# Patient Record
Sex: FEMALE | Race: BLACK OR AFRICAN AMERICAN | NOT HISPANIC OR LATINO | Employment: UNEMPLOYED | URBAN - NONMETROPOLITAN AREA
[De-identification: names, ages, dates, MRNs, and addresses within clinical notes are randomized per-mention and may not be internally consistent; named-entity substitution may affect disease eponyms.]

---

## 2023-03-13 ENCOUNTER — OFFICE VISIT (OUTPATIENT)
Dept: PRIMARY CARE | Facility: CLINIC | Age: 28
End: 2023-03-13
Payer: COMMERCIAL

## 2023-03-13 VITALS
SYSTOLIC BLOOD PRESSURE: 138 MMHG | DIASTOLIC BLOOD PRESSURE: 88 MMHG | BODY MASS INDEX: 34.36 KG/M2 | WEIGHT: 232 LBS | OXYGEN SATURATION: 97 % | HEIGHT: 69 IN | HEART RATE: 96 BPM

## 2023-03-13 DIAGNOSIS — J06.0 ACUTE LARYNGOPHARYNGITIS: Primary | ICD-10-CM

## 2023-03-13 PROCEDURE — 1036F TOBACCO NON-USER: CPT | Performed by: PHYSICIAN ASSISTANT

## 2023-03-13 PROCEDURE — 99214 OFFICE O/P EST MOD 30 MIN: CPT | Performed by: PHYSICIAN ASSISTANT

## 2023-03-13 RX ORDER — BROMPHENIRAMINE MALEATE, PSEUDOEPHEDRINE HYDROCHLORIDE, AND DEXTROMETHORPHAN HYDROBROMIDE 2; 30; 10 MG/5ML; MG/5ML; MG/5ML
5 SYRUP ORAL 4 TIMES DAILY PRN
Qty: 120 ML | Refills: 1 | Status: SHIPPED | OUTPATIENT
Start: 2023-03-13 | End: 2023-12-12 | Stop reason: ALTCHOICE

## 2023-03-13 RX ORDER — PREDNISONE 10 MG/1
10 TABLET ORAL 2 TIMES DAILY
Qty: 10 TABLET | Refills: 0 | Status: SHIPPED | OUTPATIENT
Start: 2023-03-13 | End: 2023-03-18

## 2023-03-13 RX ORDER — AZITHROMYCIN 250 MG/1
250 TABLET, FILM COATED ORAL DAILY
Qty: 6 TABLET | Refills: 0 | Status: SHIPPED | OUTPATIENT
Start: 2023-03-13 | End: 2023-12-12 | Stop reason: ALTCHOICE

## 2023-03-13 RX ORDER — SERTRALINE HYDROCHLORIDE 50 MG/1
50 TABLET, FILM COATED ORAL DAILY
COMMUNITY

## 2023-03-13 RX ORDER — BUPROPION HYDROCHLORIDE 300 MG/1
1 TABLET ORAL DAILY
COMMUNITY
Start: 2023-02-22

## 2023-03-13 RX ORDER — BUSPIRONE HYDROCHLORIDE 15 MG/1
15 TABLET ORAL 3 TIMES DAILY
COMMUNITY

## 2023-03-13 NOTE — PROGRESS NOTES
Subjective   Patient ID: Kathy Murphy is a 27 y.o. female who presents for Sore Throat and Nasal Congestion. Patient states it has been going on for a week and a half.     HPI     Review of Systems    Objective   There were no vitals taken for this visit.    Physical Exam    Assessment/Plan

## 2023-03-13 NOTE — PROGRESS NOTES
"Subjective   Patient ID: Kathy Murphy is a 27 y.o. female who presents for Sore Throat and Nasal Congestion.  HPI  Presents for evaluation of URI.  Symptoms including cough, congestion, sore throat, loss of voice, nasal congestion have been present for 7-10 days and refractory to OTC meds.  No fever, chills, nausea, vomiting, abdominal pain, CP, or SOB.  No exacerbating factors    Review of Systems  Review of Systems   Constitutional:  See HPI   ENT: See HPI  Respiratory: See HPI  Cardiovascular:  No chest pain.    Gastrointestinal: See HPI  Neurologic:  Alert and oriented X4, No numbness, No tingling.    All other systems are negative     Objective     /88   Pulse 96   Ht 1.753 m (5' 9\")   Wt 105 kg (232 lb)   SpO2 97%   BMI 34.26 kg/m²     Physical Exam    General:  Alert and oriented, No acute distress.    Eye:  Pupils are equal, round and reactive to light, Normal conjunctiva.    HENT:  Normocephalic, unremarkable oropharynx; nontender cervical lymph nodes; loss of voice noted  Respiratory: Respirations are non-labored, LCTA bilaterally  Musculoskeletal: Normal ROM and strength  Integumentary:  Warm, Dry, Intact, No pallor, No rash.    Neurologic:  Alert, Oriented, Normal sensory, Cranial Nerves II-XII are grossly intact  Psychiatric:  Cooperative, Appropriate mood & affect.    Assessment/Plan   Upper respiratory infection/acute laryngeal pharyngitis: Z-Adan, low-dose prednisone, and Bromfed.  Follow-up as needed or as scheduled  Problem List Items Addressed This Visit    None  Visit Diagnoses       Acute laryngopharyngitis    -  Primary    Relevant Medications    azithromycin (Zithromax) 250 mg tablet    predniSONE (Deltasone) 10 mg tablet    brompheniramine-pseudoeph-DM (Bromfed DM) 2-30-10 mg/5 mL syrup          "

## 2023-04-06 ENCOUNTER — OFFICE VISIT (OUTPATIENT)
Dept: PRIMARY CARE | Facility: CLINIC | Age: 28
End: 2023-04-06
Payer: COMMERCIAL

## 2023-04-06 VITALS
DIASTOLIC BLOOD PRESSURE: 80 MMHG | BODY MASS INDEX: 34.07 KG/M2 | HEART RATE: 97 BPM | HEIGHT: 69 IN | WEIGHT: 230 LBS | SYSTOLIC BLOOD PRESSURE: 122 MMHG

## 2023-04-06 DIAGNOSIS — L98.9 SKIN LESIONS: Primary | ICD-10-CM

## 2023-04-06 DIAGNOSIS — M54.50 CHRONIC RIGHT-SIDED LOW BACK PAIN WITHOUT SCIATICA: ICD-10-CM

## 2023-04-06 DIAGNOSIS — G89.29 CHRONIC RIGHT-SIDED LOW BACK PAIN WITHOUT SCIATICA: ICD-10-CM

## 2023-04-06 DIAGNOSIS — H53.9 CHANGES IN VISION: ICD-10-CM

## 2023-04-06 DIAGNOSIS — R41.0 CONFUSION AND DISORIENTATION: ICD-10-CM

## 2023-04-06 DIAGNOSIS — G89.29 CHRONIC SACROILIAC PAIN: ICD-10-CM

## 2023-04-06 DIAGNOSIS — R42 DIZZINESS: ICD-10-CM

## 2023-04-06 DIAGNOSIS — M53.3 CHRONIC SACROILIAC PAIN: ICD-10-CM

## 2023-04-06 PROCEDURE — 99215 OFFICE O/P EST HI 40 MIN: CPT | Performed by: PHYSICIAN ASSISTANT

## 2023-04-06 PROCEDURE — 1036F TOBACCO NON-USER: CPT | Performed by: PHYSICIAN ASSISTANT

## 2023-04-06 RX ORDER — OXYBUTYNIN CHLORIDE 10 MG/1
1 TABLET, EXTENDED RELEASE ORAL DAILY
COMMUNITY
Start: 2023-03-26

## 2023-04-06 RX ORDER — METHOCARBAMOL 750 MG/1
750 TABLET, FILM COATED ORAL 4 TIMES DAILY
Qty: 30 TABLET | Refills: 1 | Status: SHIPPED | OUTPATIENT
Start: 2023-04-06

## 2023-04-06 RX ORDER — PREDNISONE 10 MG/1
TABLET ORAL
Qty: 12 TABLET | Refills: 0 | Status: SHIPPED | OUTPATIENT
Start: 2023-04-06 | End: 2023-04-06 | Stop reason: SDUPTHER

## 2023-04-06 RX ORDER — METHOCARBAMOL 750 MG/1
750 TABLET, FILM COATED ORAL 4 TIMES DAILY
Qty: 30 TABLET | Refills: 1 | Status: SHIPPED | OUTPATIENT
Start: 2023-04-06 | End: 2023-04-06 | Stop reason: SDUPTHER

## 2023-04-06 RX ORDER — PREDNISONE 10 MG/1
TABLET ORAL
Qty: 12 TABLET | Refills: 0 | Status: SHIPPED | OUTPATIENT
Start: 2023-04-06 | End: 2023-12-06

## 2023-04-06 NOTE — PROGRESS NOTES
Subjective   Patient ID: Kathy Murphy is a 27 y.o. female who presents for Anxiety (Pt states she just closed on a house and her anxiety has been a little high), Fatigue (For the last couple weeks), Dizziness (Pt states she has been issues with dizziness when getting up/It only happened a few times; she has been falling into walls), Numbness (A couple weeks ago she noticed both of her arms were numb and achy ), Blurred Vision (Pt states it happens occasionally; seeing white floaters), Speech Problem (Pt states her speech has been off; she states her speech has been backwards/She feels it may be Neurological ), Hot Flashes (Pt reports she is still getting really sweaty ), and Back Pain (Pt reports lower back pain that has been present for awhile/She is seeing a chiropractor; acupuncture etc./She ended up falling off a hover board on her LT buttock about a year ago).  HPI    Patient presents for multiple complaints.    Patient reports chronic low back pain on the right, patient fell off a hover board approximate 1 year ago.  Patient has been seeing a chiropractor, has undergone acupuncture, and massage therapy with no relief.  Patient states that pain is primarily on the right and near the buttocks.  Patient denies any radiculopathy or paresthesias in the legs.  No saddle paresthesias.  Currently, the patient reports only mild to moderate pain.    Patient also has some nebulous complaints including scotoma, confusion, disorientation, dizziness, and upper arm paresthesias.  Patient states that these are fleeting and self-limited.  No report that they all occur simultaneously.  Dizziness occurred approximately 2 to 3 weeks ago.  Patient states that confusion with speech patterns has been recent.  Seeing floaters is intermittent.  Currently, patient is not complaining of any of the symptoms.    Patient also reports recent fatigue.  Patient complains of early fatigue with activity.  Patient has been taking naps  "recently.  No other associated signs or symptoms.  Review of Systems    Constitutional:  See HPI   Eye: See HPI  Respiratory:  No shortness of breath, No cough.    Cardiovascular:  No chest pain.     Musculoskeletal: See HPI  Integumentary:  No rash.    Neurologic: See HPI  All other systems are negative     Objective     /80 (BP Location: Left arm, Patient Position: Sitting, BP Cuff Size: Adult)   Pulse 97   Ht 1.753 m (5' 9\")   Wt 104 kg (230 lb)   BMI 33.97 kg/m²     Physical Exam  General:  Alert and oriented, No acute distress.    Eye:  Pupils are equal, round and reactive to light, Extraocular movements are intact, Normal conjunctiva.    HENT:  Normocephalic, Normal hearing, Oral mucosa is moist, No pharyngeal erythema, No sinus tenderness.    Neck:  Supple, Non-tender, No lymphadenopathy.    Respiratory:  Lungs are clear to auscultation, Respirations are non-labored, Breath sounds are equal    Cardiovascular:  Normal rate, Regular rhythm.    Gastrointestinal:  Non-distended.    Musculoskeletal: Normal range of motion, Normal strength, some tenderness near the right sacroiliac joint; full lumbar range of motion noted; no midline tenderness or step-offs normal gait.     Integumentary:  Warm, Dry, Intact, No pallor, No rash.    Neurologic:  Alert, Oriented, Normal sensory, Normal motor function, No focal deficits, Cranial Nerves II-XII are grossly intact   Psychiatric:  Cooperative, Appropriate mood & affect.    Assessment/Plan   Chronic right-sided low back pain-sacroiliitis: Prednisone and Robaxin.  Referral to physical therapy ordered.  Topical formulation from transdermal therapeutic ordered.    Changes in vision/dizziness/disorientation: CT brain ordered.  Patient had labs done in January that were relatively unremarkable.  Further recommendations pending results.  Problem List Items Addressed This Visit       Changes in vision    Relevant Orders    CT head w and wo IV contrast     Other Visit " Diagnoses       Chronic right-sided low back pain without sciatica    -  Primary    Relevant Medications    predniSONE (Deltasone) 10 mg tablet    Other Relevant Orders    Referral to Physical Therapy    Chronic sacroiliac pain        Relevant Medications    predniSONE (Deltasone) 10 mg tablet    methocarbamol (Robaxin) 750 mg tablet    Confusion and disorientation        Relevant Orders    CT head w and wo IV contrast    Dizziness        Relevant Orders    CT head w and wo IV contrast            Final diagnoses:   [M54.50, G89.29] Chronic right-sided low back pain without sciatica   [M53.3, G89.29] Chronic sacroiliac pain   [H53.9] Changes in vision   [R41.0] Confusion and disorientation   [R42] Dizziness

## 2023-04-12 ENCOUNTER — TELEPHONE (OUTPATIENT)
Dept: PRIMARY CARE | Facility: CLINIC | Age: 28
End: 2023-04-12
Payer: COMMERCIAL

## 2023-12-06 ENCOUNTER — OFFICE VISIT (OUTPATIENT)
Dept: URGENT CARE | Facility: CLINIC | Age: 28
End: 2023-12-06
Payer: COMMERCIAL

## 2023-12-06 VITALS
RESPIRATION RATE: 16 BRPM | OXYGEN SATURATION: 98 % | HEART RATE: 100 BPM | TEMPERATURE: 98.1 F | SYSTOLIC BLOOD PRESSURE: 101 MMHG | DIASTOLIC BLOOD PRESSURE: 70 MMHG

## 2023-12-06 DIAGNOSIS — J20.8 VIRAL BRONCHITIS: ICD-10-CM

## 2023-12-06 DIAGNOSIS — J06.9 VIRAL URI WITH COUGH: Primary | ICD-10-CM

## 2023-12-06 LAB
POC TRIPLEX FLU A-AG: NORMAL
POC TRIPLEX FLU B-AG: NORMAL
POC TRIPLEX SARSCOV-2 AG: NORMAL

## 2023-12-06 PROCEDURE — 99213 OFFICE O/P EST LOW 20 MIN: CPT | Mod: 25

## 2023-12-06 PROCEDURE — 87428 SARSCOV & INF VIR A&B AG IA: CPT

## 2023-12-06 RX ORDER — PREDNISONE 10 MG/1
30 TABLET ORAL DAILY
Qty: 21 TABLET | Refills: 0 | Status: SHIPPED | OUTPATIENT
Start: 2023-12-06 | End: 2023-12-13

## 2023-12-06 NOTE — PROGRESS NOTES
[facility] [place] NOTE:      Name: Kathy Murphy, 28 y.o.    CSN:[id number]   MRN:[id number]    PCP: Daryn Decker PA-C    ALL:    Allergies   Allergen Reactions    Aripiprazole Other       History:    Chief Complaint: Cough, Wheezing, Nasal Congestion, and Headache (X 2 WEEKS)    Encounter Date: 12/6/2023     HPI: The history was obtained from the patient. Kathy is a 28 y.o. female, who presents with a chief complaint of Cough, Wheezing, Nasal Congestion, and Headache (X 2 WEEKS). She states she does not have much nasal congestion but has sinus pressure. She has been taking ibuprofen for headache symptoms which she says has helped. She also has been taking leftover amoxicillin x 1 week twice a day with no improvement in symptoms. She states her daughter has the same illness. She denies fevers, N/V, sob, chest pain, abdominal pain.     PMHx:    Past Medical History:   Diagnosis Date    Bipolar disorder, currently in remission, most recent episode unspecified (CMS/formerly [facility]) 03/18/2022    History of depressed bipolar disorder    Chlamydial infection, unspecified 04/29/2020    Chlamydia infection    COVID-19 04/08/2022    COVID-19 virus infection    Encounter for full-term uncomplicated delivery     Normal vaginal delivery    Encounter for gynecological examination (general) (routine) without abnormal findings     Pap test, as part of routine gynecological examination    Encounter for routine postpartum follow-up 05/20/2022    Postpartum exam    Encounter for screening for infections with a predominantly sexual mode of transmission 10/04/2021    Screening for STD (sexually transmitted disease)    Encounter for screening for malignant neoplasm of cervix 10/04/2021    Encounter for screening for cervical cancer    Other conditions influencing health status     Menstruation    Other long term (current) drug therapy 08/13/2020    Controlled substance agreement signed    Personal  history of other complications of pregnancy, childbirth and the puerperium     History of spontaneous     Personal history of other infectious and parasitic diseases 2021    History of viral warts              Current Outpatient Medications   Medication Sig Dispense Refill    azithromycin (Zithromax) 250 mg tablet Take 1 tablet (250 mg) by mouth once daily. 2 tabs po day 1 1 tab po every day days 2-5 (Patient not taking: Reported on 2023) 6 tablet 0    brompheniramine-pseudoeph-DM (Bromfed DM) 2-30-10 mg/5 mL syrup Take 5 mL by mouth 4 times a day as needed for allergies, congestion or cough. (Patient not taking: Reported on 2023) 120 mL 1    buPROPion XL (Wellbutrin XL) 300 mg 24 hr tablet Take 1 tablet (300 mg) by mouth once daily.      busPIRone (Buspar) 15 mg tablet Take 1 tablet (15 mg) by mouth 3 times a day.      methocarbamol (Robaxin) 750 mg tablet Take 1 tablet (750 mg) by mouth in the morning and 1 tablet (750 mg) at noon and 1 tablet (750 mg) in the evening and 1 tablet (750 mg) before bedtime. As needed for back pain/spasm. (Patient not taking: Reported on 2023) 30 tablet 1    oxybutynin XL (Ditropan-XL) 10 mg 24 hr tablet Take 1 tablet (10 mg) by mouth once daily.      predniSONE (Deltasone) 10 mg tablet Take 3 tablets (30 mg) by mouth once daily for 7 days. 21 tablet 0    sertraline (Zoloft) 50 mg tablet Take 1 tablet (50 mg) by mouth once daily.       No current facility-administered medications for this visit.         PMSx:    Past Surgical History:   Procedure Laterality Date    OTHER SURGICAL HISTORY  2021    No history of surgery       Fam Hx: No family history on file.    SOC. Hx:     Social History     Socioeconomic History    Marital status: Single     Spouse name: Not on file    Number of children: Not on file    Years of education: Not on file    Highest education level: Not on file   Occupational History    Not on file   Tobacco Use    Smoking status:  Former     Types: Cigarettes    Smokeless tobacco: Never   Vaping Use    Vaping Use: Every day    Substances: Nicotine    Devices: Disposable   Substance and Sexual Activity    Alcohol use: Not Currently    Drug use: Never    Sexual activity: Not on file   Other Topics Concern    Not on file   Social History Narrative    Not on file     Social Determinants of Health     Financial Resource Strain: Not on file   Food Insecurity: Not on file   Transportation Needs: Not on file   Physical Activity: Not on file   Stress: Not on file   Social Connections: Not on file   Intimate Partner Violence: Not on file   Housing Stability: Not on file         Vitals:    12/06/23 1732   BP: 101/70   Pulse: 100   Resp: 16   Temp: 36.7 °C (98.1 °F)   SpO2: 98%                Physical Exam  Constitutional:       Appearance: Normal appearance.   HENT:      Right Ear: Tympanic membrane normal.      Left Ear: Tympanic membrane normal.      Nose: Nose normal.      Mouth/Throat:      Mouth: Mucous membranes are moist.      Pharynx: Oropharynx is clear.   Eyes:      Conjunctiva/sclera: Conjunctivae normal.      Pupils: Pupils are equal, round, and reactive to light.   Cardiovascular:      Rate and Rhythm: Normal rate and regular rhythm.   Pulmonary:      Effort: Pulmonary effort is normal.      Breath sounds: Normal breath sounds.   Skin:     General: Skin is warm.   Neurological:      General: No focal deficit present.      Mental Status: She is alert and oriented to person, place, and time.   Psychiatric:         Mood and Affect: Mood normal.         Behavior: Behavior normal.         LABORATORY @ RADIOLOGICAL IMAGING (if done):     Results for orders placed or performed in visit on 12/06/23 (from the past 24 hour(s))   POCT BD Veritor Triplex Ag   Result Value Ref Range    POC Triplex SARS-CoV-2 Ag  Presumptive negative for Triplex SARS-CoV-2 (no antigen detected)     Presumptive negative for Triplex SARS-CoV-2 (no antigen detected)    POC  Triplex Flu A-Ag  Presumptive negative for Triplex FLU A (no antigen detected)     Presumptive negative for Triplex FLU A (no antigen detected)    POC Triplex Flu B-Ag  Presumptive negative for Triplex FLU B (no antigen detected)     Presumptive negative for Triplex FLU B (no antigen detected)        COURSE/MEDICAL DECISION MAKING:    Kathy is a 28 y.o., who presents with a working diagnosis of   1. Viral URI with cough    2. Viral bronchitis      Kathy was seen today for cough, wheezing, nasal congestion and headache.  Diagnoses and all orders for this visit:  Viral URI with cough (Primary)  -     POCT BD Veritor Triplex Ag  -     predniSONE (Deltasone) 10 mg tablet; Take 3 tablets (30 mg) by mouth once daily for 7 days.  Viral bronchitis    After my independent evaluation, she appears to have a self-limiting illness likely due to a viral URI.   At this time, there is a no evidence of pneumonia, hypoxia, OM, bacterial sinus infection, bacterial bronchitis, bacteremia, or sepsis.     In my medical opinion, I consider Kathy to be low risk for any serious/life-threatening entity, and deem her stable for discharge. This is based on the information that was available to me at the time of this visit.      As we discussed, she is to return to our office or ER immediately if there is any worsening of her condition, such as increased cough, shortness of breath, persistent fevers, repeated vomiting, dehydration, or if her condition worsens at all.      Mahi Santos PA-C   Advanced Practice Provider  [facility] URG[facility]

## 2023-12-12 ENCOUNTER — OFFICE VISIT (OUTPATIENT)
Dept: PRIMARY CARE | Facility: CLINIC | Age: 28
End: 2023-12-12
Payer: COMMERCIAL

## 2023-12-12 VITALS
HEIGHT: 69 IN | WEIGHT: 214.9 LBS | DIASTOLIC BLOOD PRESSURE: 78 MMHG | SYSTOLIC BLOOD PRESSURE: 115 MMHG | BODY MASS INDEX: 31.83 KG/M2 | HEART RATE: 104 BPM

## 2023-12-12 DIAGNOSIS — J40 BRONCHITIS: ICD-10-CM

## 2023-12-12 DIAGNOSIS — J06.9 UPPER RESPIRATORY TRACT INFECTION, UNSPECIFIED TYPE: Primary | ICD-10-CM

## 2023-12-12 PROCEDURE — 1036F TOBACCO NON-USER: CPT | Performed by: PHYSICIAN ASSISTANT

## 2023-12-12 PROCEDURE — 99214 OFFICE O/P EST MOD 30 MIN: CPT | Performed by: PHYSICIAN ASSISTANT

## 2023-12-12 RX ORDER — BROMPHENIRAMINE MALEATE, PSEUDOEPHEDRINE HYDROCHLORIDE, AND DEXTROMETHORPHAN HYDROBROMIDE 2; 30; 10 MG/5ML; MG/5ML; MG/5ML
5 SYRUP ORAL EVERY 4 HOURS PRN
Qty: 120 ML | Refills: 1 | Status: SHIPPED | OUTPATIENT
Start: 2023-12-12

## 2023-12-12 RX ORDER — AZITHROMYCIN 250 MG/1
250 TABLET, FILM COATED ORAL DAILY
Qty: 6 TABLET | Refills: 0 | Status: SHIPPED | OUTPATIENT
Start: 2023-12-12

## 2023-12-12 RX ORDER — PREDNISONE 10 MG/1
TABLET ORAL
Qty: 30 TABLET | Refills: 0 | Status: SHIPPED | OUTPATIENT
Start: 2023-12-12

## 2023-12-12 ASSESSMENT — PATIENT HEALTH QUESTIONNAIRE - PHQ9
SUM OF ALL RESPONSES TO PHQ9 QUESTIONS 1 AND 2: 0
1. LITTLE INTEREST OR PLEASURE IN DOING THINGS: NOT AT ALL
2. FEELING DOWN, DEPRESSED OR HOPELESS: NOT AT ALL

## 2023-12-12 NOTE — PROGRESS NOTES
"Subjective   Patient ID: Kathy Murphy is a 28 y.o. female who presents for Annual Exam (Patient here today for yearly exam, patient offers no complaints./Patient scheduled for PAP with Dr. Chavez next year.).  HPI  Patient presents for annual follow-up exam and labs.    Patient is currently being treated for an upper respiratory infection with prednisone.  Symptoms started over 2 to 3 weeks ago patient was seen in an urgent care last week.  Diagnosis of viral upper respiratory infection and prednisone was provided.  Patient was progressive and possibly worsening symptoms despite this.  No fever or chills.  The patient does continue to use an e-cigarette.    Of note, patient stopped all previously prescribed medications for anxiety and depression.  Patient is taking no medicines daily at this time.  Patient is trying a more holistic and naturopath type approach.    Review of Systems    Constitutional:  See HPI   ENT: See HPI  Respiratory: See HPI  Neurologic:  Alert and oriented X4, No numbness, No tingling.    All other systems are negative     Objective     /78   Pulse 104   Ht 1.753 m (5' 9\")   Wt 97.5 kg (214 lb 14.4 oz)   BMI 31.74 kg/m²     Physical Exam  General:  Alert and oriented, No acute distress.    Eye:  Pupils are equal, round and reactive to light, Extraocular movements are intact, Normal conjunctiva.    HENT:  Normocephalic, Normal hearing, Oral mucosa is moist, No pharyngeal erythema, No sinus tenderness.    Neck:  Supple, Non-tender, No lymphadenopathy.    Respiratory: Bilateral diffuse wheezing and scattered rhonchi, more pronounced at the bases; no rales  Cardiovascular:  Normal rate, Regular rhythm.    Gastrointestinal:  Non-distended.    Musculoskeletal: Normal range of motion, Normal strength, No tenderness, No swelling, No deformity, Normal gait.     Integumentary:  Warm, Dry, Intact, No pallor, No rash.    Neurologic:  Alert, Oriented, Normal sensory, Normal motor function, " No focal deficits, Cranial Nerves II-XII are grossly intact   Psychiatric:  Cooperative, Appropriate mood & affect.    Assessment/Plan   Upper respiratory infection with bronchitis: Z-Adan, restart prednisone taper, and Bromfed.    Patient requesting screening labs so these were ordered.  Recommend waiting until the acute illness is over to obtain.  Patient expressed understanding and agreed.  Problem List Items Addressed This Visit    None  Visit Diagnoses       Upper respiratory tract infection, unspecified type    -  Primary    Relevant Medications    azithromycin (Zithromax) 250 mg tablet    predniSONE (Deltasone) 10 mg tablet    brompheniramine-pseudoeph-DM (Bromfed DM) 2-30-10 mg/5 mL syrup    Other Relevant Orders    CBC    Hemoglobin A1C    Lipid Panel    Vitamin D 25-Hydroxy,Total (for eval of Vitamin D levels)    TSH with reflex to Free T4 if abnormal    Comprehensive Metabolic Panel    Bronchitis        Relevant Medications    azithromycin (Zithromax) 250 mg tablet    predniSONE (Deltasone) 10 mg tablet    brompheniramine-pseudoeph-DM (Bromfed DM) 2-30-10 mg/5 mL syrup    Other Relevant Orders    CBC    Hemoglobin A1C    Lipid Panel    Vitamin D 25-Hydroxy,Total (for eval of Vitamin D levels)    TSH with reflex to Free T4 if abnormal    Comprehensive Metabolic Panel            Final diagnoses:   [J06.9] Upper respiratory tract infection, unspecified type   [J40] Bronchitis

## 2023-12-29 ENCOUNTER — OFFICE VISIT (OUTPATIENT)
Dept: URGENT CARE | Facility: CLINIC | Age: 28
End: 2023-12-29
Payer: COMMERCIAL

## 2023-12-29 VITALS
HEART RATE: 100 BPM | HEIGHT: 69 IN | DIASTOLIC BLOOD PRESSURE: 83 MMHG | RESPIRATION RATE: 16 BRPM | BODY MASS INDEX: 31.84 KG/M2 | TEMPERATURE: 98.3 F | OXYGEN SATURATION: 91 % | WEIGHT: 214.95 LBS | SYSTOLIC BLOOD PRESSURE: 123 MMHG

## 2023-12-29 DIAGNOSIS — J32.9 RHINOSINUSITIS: Primary | ICD-10-CM

## 2023-12-29 LAB
POC RSV RAPID ANTIGEN: NEGATIVE
POC TRIPLEX FLU A-AG: NORMAL
POC TRIPLEX FLU B-AG: NORMAL
POC TRIPLEX SARSCOV-2 AG: NORMAL

## 2023-12-29 PROCEDURE — 87428 SARSCOV & INF VIR A&B AG IA: CPT

## 2023-12-29 PROCEDURE — 99213 OFFICE O/P EST LOW 20 MIN: CPT | Mod: 25

## 2023-12-29 RX ORDER — FLUTICASONE PROPIONATE 50 MCG
1 SPRAY, SUSPENSION (ML) NASAL DAILY
Qty: 16 G | Refills: 0 | Status: SHIPPED | OUTPATIENT
Start: 2023-12-29 | End: 2024-12-28

## 2023-12-29 NOTE — PROGRESS NOTES
[facility] URGENT [facility]ARE [place] NOTE:      Name: Kathy Murphy, 28 y.o.    CSN:[id number]   MRN:[id number]    PCP: Daryn Decker PA-C    ALL:    Allergies   Allergen Reactions    Aripiprazole Other       History:    Chief Complaint: URI    Encounter Date: 12/29/2023      HPI: The history was obtained from the patient. Kathy is a 28 y.o. female, who presents with a chief complaint of URI. She was seen on 12/6/23 for viral uri and was given prednisone. Seen by primary care on 12/12/23 and was given Zithromax, predisone taper and bromfed. States she has taken all but 2 of the prednisone as it made her feel off. She felt like her symptoms were improving after Zithromax and prednisone but recently started feeling worse about 4 days ago. Her daughter tested positive for RSV on 12/24/23. She has nasal and sinus congestion with ear pain. She denies fevers, N/V, sore throat, cough, chest pain, sob.     PMHx:    Past Medical History:   Diagnosis Date    Bipolar disorder, currently in remission, most recent episode unspecified (CMS/[facility]) 03/18/2022    History of depressed bipolar disorder    Chlamydial infection, unspecified 04/29/2020    Chlamydia infection    COVID-19 04/08/2022    COVID-19 virus infection    Encounter for full-term uncomplicated delivery     Normal vaginal delivery    Encounter for gynecological examination (general) (routine) without abnormal findings     Pap test, as part of routine gynecological examination    Encounter for routine postpartum follow-up 05/20/2022    Postpartum exam    Encounter for screening for infections with a predominantly sexual mode of transmission 10/04/2021    Screening for STD (sexually transmitted disease)    Encounter for screening for malignant neoplasm of cervix 10/04/2021    Encounter for screening for cervical cancer    Other conditions influencing health status     Menstruation    Other long term (current) drug therapy 08/13/2020    Controlled  substance agreement signed    Personal history of other complications of pregnancy, childbirth and the puerperium     History of spontaneous     Personal history of other infectious and parasitic diseases 2021    History of viral warts              Current Outpatient Medications   Medication Sig Dispense Refill    azithromycin (Zithromax) 250 mg tablet Take 1 tablet (250 mg) by mouth once daily. 2 tabs po day 1 1 tab po every day days 2-5 6 tablet 0    brompheniramine-pseudoeph-DM (Bromfed DM) 2-30-10 mg/5 mL syrup Take 5 mL by mouth every 4 hours if needed for allergies, congestion or cough. 120 mL 1    buPROPion XL (Wellbutrin XL) 300 mg 24 hr tablet Take 1 tablet (300 mg) by mouth once daily.      busPIRone (Buspar) 15 mg tablet Take 1 tablet (15 mg) by mouth 3 times a day.      fluticasone (Flonase) 50 mcg/actuation nasal spray Administer 1 spray into each nostril once daily. Shake gently. Before first use, prime pump. After use, clean tip and replace cap. 16 g 0    methocarbamol (Robaxin) 750 mg tablet Take 1 tablet (750 mg) by mouth in the morning and 1 tablet (750 mg) at noon and 1 tablet (750 mg) in the evening and 1 tablet (750 mg) before bedtime. As needed for back pain/spasm. (Patient not taking: Reported on 2023) 30 tablet 1    oxybutynin XL (Ditropan-XL) 10 mg 24 hr tablet Take 1 tablet (10 mg) by mouth once daily.      predniSONE (Deltasone) 10 mg tablet 4 tabs po every day x3 days, then 3 tabs po every day x3 days, then 2 tabs po every day x3 days, then 1 tab po every day x3 days 30 tablet 0    sertraline (Zoloft) 50 mg tablet Take 1 tablet (50 mg) by mouth once daily.       No current facility-administered medications for this visit.         PMSx:    Past Surgical History:   Procedure Laterality Date    OTHER SURGICAL HISTORY  2021    No history of surgery       Fam Hx: No family history on file.    SOC. Hx:     Social History     Socioeconomic History    Marital status:  Single     Spouse name: Not on file    Number of children: Not on file    Years of education: Not on file    Highest education level: Not on file   Occupational History    Not on file   Tobacco Use    Smoking status: Former     Types: Cigarettes    Smokeless tobacco: Never   Vaping Use    Vaping Use: Every day    Substances: Nicotine    Devices: Disposable   Substance and Sexual Activity    Alcohol use: Not Currently    Drug use: Never    Sexual activity: Not on file   Other Topics Concern    Not on file   Social History Narrative    Not on file     Social Determinants of Health     Financial Resource Strain: Not on file   Food Insecurity: Not on file   Transportation Needs: Not on file   Physical Activity: Not on file   Stress: Not on file   Social Connections: Not on file   Intimate Partner Violence: Not on file   Housing Stability: Not on file         Vitals:    12/29/23 1154   BP: 123/83   Pulse: 100   Resp: 16   Temp: 36.8 °C (98.3 °F)   SpO2: 91%     97.5 kg (214 lb 15.2 oz)          Physical Exam  Constitutional:       Appearance: Normal appearance.   HENT:      Right Ear: A middle ear effusion is present.      Left Ear: A middle ear effusion is present.      Nose: Congestion present.      Right Turbinates: Enlarged and swollen.      Left Turbinates: Enlarged and swollen.      Right Sinus: No maxillary sinus tenderness or frontal sinus tenderness.      Left Sinus: No maxillary sinus tenderness or frontal sinus tenderness.      Mouth/Throat:      Mouth: Mucous membranes are moist.      Pharynx: Oropharynx is clear.   Eyes:      Conjunctiva/sclera: Conjunctivae normal.      Pupils: Pupils are equal, round, and reactive to light.   Cardiovascular:      Rate and Rhythm: Normal rate and regular rhythm.   Pulmonary:      Effort: Pulmonary effort is normal.      Breath sounds: Normal breath sounds.   Skin:     General: Skin is warm.   Neurological:      General: No focal deficit present.      Mental Status: She is  alert and oriented to person, place, and time.   Psychiatric:         Mood and Affect: Mood normal.         Behavior: Behavior normal.         LABORATORY @ RADIOLOGICAL IMAGING (if done):     Results for orders placed or performed in visit on 12/29/23 (from the past 24 hour(s))   POCT BD Veritor Triplex Ag   Result Value Ref Range    POC Triplex SARS-CoV-2 Ag  Presumptive negative for Triplex SARS-CoV-2 (no antigen detected)     Presumptive negative for Triplex SARS-CoV-2 (no antigen detected)    POC Triplex Flu A-Ag  Presumptive negative for Triplex FLU A (no antigen detected)     Presumptive negative for Triplex FLU A (no antigen detected)    POC Triplex Flu B-Ag  Presumptive negative for Triplex FLU B (no antigen detected)     Presumptive negative for Triplex FLU B (no antigen detected)   POCT respiratory syncytial virus manually resulted   Result Value Ref Range    RSV Rapid Ag Negative Negative       UC COURSE/MEDICAL DECISION MAKING:    Kathy is a 28 y.o., who presents with a working diagnosis of   1. Rhinosinusitis      Kathy was seen today for uri.  Diagnoses and all orders for this visit:  Rhinosinusitis (Primary)  -     fluticasone (Flonase) 50 mcg/actuation nasal spray; Administer 1 spray into each nostril once daily. Shake gently. Before first use, prime pump. After use, clean tip and replace cap.  -     POCT BD Veritor Triplex Ag  -     POCT respiratory syncytial virus manually resulted    Likely a post viral rhino sinusitis. Current plan is to use Flonase spray for congestion. Continue to use bromfed given to her by her pcp on 12/13 (there is 1 refill on the script). Push fluids. Continue with otc medications for symptomatic management.     As we discussed, she is to return to our office or ER immediately if there is any worsening of her condition, such as increased cough, shortness of breath, persistent fevers, repeated vomiting, dehydration, or if her condition worsens at all.      Mahi  JA Santos   Advanced Practice Provider  [facility] URGENT CARE

## 2025-02-28 ENCOUNTER — TELEPHONE (OUTPATIENT)
Dept: PRIMARY CARE | Facility: CLINIC | Age: 30
End: 2025-02-28
Payer: COMMERCIAL

## 2025-02-28 NOTE — TELEPHONE ENCOUNTER
Pt hasn't been seen since 12/2023 - will have to call and establish care - also have no record of a pain cream in the chart.